# Patient Record
(demographics unavailable — no encounter records)

---

## 2025-07-29 NOTE — ASSESSMENT
[FreeTextEntry1] : DIANA MONDRAGON is a 55 year old female who underwent a  Robotic-assisted laparoscopic cholecystectomy with cholangiography. Robotic laparoscopic transcystic common bile duct exploration on 07/17/2025 pathology result pending    Patient is doing well. All surgical incisions are healing well and as expected. There is no evidence of infection or complication, and patient is progressing as expected. Post-operative wound care, activity, restrictions and precautions reinforced.  Pathology results were discussed in detail. Patient was instructed no heavy lifting 2 to 4 weeks. Patient's questions and concerns addressed to patient's satisfaction.

## 2025-07-29 NOTE — HISTORY OF PRESENT ILLNESS
[de-identified] : DIANA MONDRAGON is a 55 year old female who presents in the office for postop follow up visit. She underwent a Robotic-assisted laparoscopic cholecystectomy with cholangiography. Robotic laparoscopic transcystic common bile duct exploration on 07/17/2025 pathology result pending Today patient is doing well, offers no complaints. Denies any fevers, chills, nausea, vomiting, diarrhea or constipation. Patient able to tolerate regular diet with normal bowel movements. Surgical incisions are healing well. No sign of inflammation or exudate.

## 2025-07-29 NOTE — HISTORY OF PRESENT ILLNESS
[de-identified] : DIANA MONDRAGON is a 55 year old female who presents in the office for postop follow up visit. She underwent a Robotic-assisted laparoscopic cholecystectomy with cholangiography. Robotic laparoscopic transcystic common bile duct exploration on 07/17/2025 pathology result pending Today patient is doing well, offers no complaints. Denies any fevers, chills, nausea, vomiting, diarrhea or constipation. Patient able to tolerate regular diet with normal bowel movements. Surgical incisions are healing well. No sign of inflammation or exudate.

## 2025-07-29 NOTE — HISTORY OF PRESENT ILLNESS
[de-identified] : DIANA MONDRAGON is a 55 year old female who presents in the office for postop follow up visit. She underwent a Robotic-assisted laparoscopic cholecystectomy with cholangiography. Robotic laparoscopic transcystic common bile duct exploration on 07/17/2025 pathology result pending Today patient is doing well, offers no complaints. Denies any fevers, chills, nausea, vomiting, diarrhea or constipation. Patient able to tolerate regular diet with normal bowel movements. Surgical incisions are healing well. No sign of inflammation or exudate.

## 2025-07-29 NOTE — PHYSICAL EXAM
[No Rash or Lesion] : No rash or lesion [Alert] : alert [Oriented to Person] : oriented to person [Oriented to Place] : oriented to place [Oriented to Time] : oriented to time [Calm] : calm [de-identified] : The patient is alert, well-groomed  [de-identified] : Incision sites are healing well.  [de-identified] : full range of motion and no deformities appreciated.

## 2025-07-29 NOTE — PHYSICAL EXAM
[No Rash or Lesion] : No rash or lesion [Alert] : alert [Oriented to Person] : oriented to person [Oriented to Place] : oriented to place [Oriented to Time] : oriented to time [Calm] : calm [de-identified] : The patient is alert, well-groomed  [de-identified] : Incision sites are healing well.  [de-identified] : full range of motion and no deformities appreciated.

## 2025-07-29 NOTE — PHYSICAL EXAM
[No Rash or Lesion] : No rash or lesion [Alert] : alert [Oriented to Person] : oriented to person [Oriented to Place] : oriented to place [Oriented to Time] : oriented to time [Calm] : calm [de-identified] : The patient is alert, well-groomed  [de-identified] : Incision sites are healing well.  [de-identified] : full range of motion and no deformities appreciated.